# Patient Record
(demographics unavailable — no encounter records)

---

## 2023-07-03 NOTE — NUR
C/O L SIDED CHEST/ RIB AREA PAIN, L SHOULDER HAS BEEN SORE SINCE YESTERDAY. 
PAIN 5/10 ON PAIN SCALE. VITAL ARE WITHIN NORMAL LIMITS. AWAITING MD WATSON.